# Patient Record
Sex: MALE | Race: WHITE | NOT HISPANIC OR LATINO | Employment: FULL TIME | URBAN - METROPOLITAN AREA
[De-identification: names, ages, dates, MRNs, and addresses within clinical notes are randomized per-mention and may not be internally consistent; named-entity substitution may affect disease eponyms.]

---

## 2023-01-09 ENCOUNTER — OFFICE VISIT (OUTPATIENT)
Dept: FAMILY MEDICINE CLINIC | Facility: CLINIC | Age: 48
End: 2023-01-09
Payer: MEDICAID

## 2023-01-09 VITALS
HEIGHT: 75 IN | TEMPERATURE: 97.6 F | WEIGHT: 225 LBS | BODY MASS INDEX: 27.98 KG/M2 | DIASTOLIC BLOOD PRESSURE: 90 MMHG | HEART RATE: 92 BPM | SYSTOLIC BLOOD PRESSURE: 130 MMHG | OXYGEN SATURATION: 99 %

## 2023-01-09 DIAGNOSIS — I10 PRIMARY HYPERTENSION: ICD-10-CM

## 2023-01-09 DIAGNOSIS — K21.9 GASTROESOPHAGEAL REFLUX DISEASE WITHOUT ESOPHAGITIS: Primary | ICD-10-CM

## 2023-01-09 PROCEDURE — 99214 OFFICE O/P EST MOD 30 MIN: CPT | Performed by: FAMILY MEDICINE

## 2023-01-09 RX ORDER — LISINOPRIL 20 MG/1
20 TABLET ORAL DAILY
Qty: 90 TABLET | Refills: 1 | Status: SHIPPED | OUTPATIENT
Start: 2023-01-09 | End: 2023-01-11

## 2023-01-09 RX ORDER — OMEPRAZOLE 40 MG/1
40 CAPSULE, DELAYED RELEASE ORAL EVERY OTHER DAY
COMMUNITY

## 2023-01-09 NOTE — PROGRESS NOTES
Subjective   Fabrice Birch is a 47 y.o. male.     Chief Complaint   Patient presents with   • Establish Care   • Hypertension     F/u ER       History of Present Illness   Patient has been to the immediate care center twice for episodes of hypertension.  He is here today to follow-up for that.  Has not presented to care in some time. Bp have been running 150/90 for weeks. Has been as high as 190/120. Have not been able to find [place] records. Has tried cutting down on sodium. Pt reports ekg ok. Pt is working on tapering smoking.     gerd- doing well on meds    The following portions of the patient's history were reviewed and updated as appropriate: allergies, current medications, past family history, past medical history, past social history, past surgical history and problem list.    History reviewed. No pertinent past medical history.    Past Surgical History:   Procedure Laterality Date   • SHOULDER ARTHROSCOPY W/ LABRAL REPAIR Right        Family History   Problem Relation Age of Onset   • Thyroid disease Mother    • Hypertension Mother    • Hypertension Father        Social History     Socioeconomic History   • Marital status:        Review of Systems   Eyes: Negative for visual disturbance.   Respiratory: Negative for shortness of breath.    Cardiovascular: Negative for chest pain.       Objective   Visit Vitals  /90   Pulse 92   Temp 97.6 °F (36.4 °C) (Infrared)   Ht 190.5 cm (75\")   Wt 102 kg (225 lb)   SpO2 99%   BMI 28.12 kg/m²     Body mass index is 28.12 kg/m².  Physical Exam  Constitutional:       Appearance: Normal appearance. He is well-developed.   Cardiovascular:      Rate and Rhythm: Normal rate and regular rhythm.      Heart sounds: Normal heart sounds.   Pulmonary:      Effort: Pulmonary effort is normal.      Breath sounds: Normal breath sounds.   Musculoskeletal:         General: No swelling. Normal range of motion.   Skin:     General: Skin is warm and dry.      Findings: No  rash.   Neurological:      General: No focal deficit present.      Mental Status: He is alert and oriented to person, place, and time.   Psychiatric:         Mood and Affect: Mood normal.         Behavior: Behavior normal.           Assessment & Plan   Diagnoses and all orders for this visit:    1. Gastroesophageal reflux disease without esophagitis (Primary)    2. Primary hypertension  -     lisinopril (PRINIVIL,ZESTRIL) 20 MG tablet; Take 1 tablet by mouth Daily.  Dispense: 90 tablet; Refill: 1  -     CBC & Differential  -     Comprehensive Metabolic Panel  -     Lipid Panel  -     TSH Rfx On Abnormal To Free T4  -     Urinalysis With Microscopic - Urine, Clean Catch      Risks and benefits discussed and started ace and f/u in 1 month. Pt is working on quitting smoking.

## 2023-01-10 LAB
ALBUMIN SERPL-MCNC: 5.5 G/DL (ref 3.5–5.2)
ALBUMIN/GLOB SERPL: 3.4 G/DL
ALP SERPL-CCNC: 80 U/L (ref 39–117)
ALT SERPL-CCNC: 27 U/L (ref 1–41)
APPEARANCE UR: ABNORMAL
AST SERPL-CCNC: 20 U/L (ref 1–40)
BACTERIA #/AREA URNS HPF: ABNORMAL /HPF
BASOPHILS # BLD AUTO: 0.05 10*3/MM3 (ref 0–0.2)
BASOPHILS NFR BLD AUTO: 0.6 % (ref 0–1.5)
BILIRUB SERPL-MCNC: 0.8 MG/DL (ref 0–1.2)
BILIRUB UR QL STRIP: NEGATIVE
BUN SERPL-MCNC: 24 MG/DL (ref 6–20)
BUN/CREAT SERPL: 24.2 (ref 7–25)
CALCIUM SERPL-MCNC: 10.2 MG/DL (ref 8.6–10.5)
CASTS URNS MICRO: ABNORMAL
CHLORIDE SERPL-SCNC: 101 MMOL/L (ref 98–107)
CHOLEST SERPL-MCNC: 218 MG/DL (ref 0–200)
CO2 SERPL-SCNC: 26.3 MMOL/L (ref 22–29)
COLOR UR: YELLOW
CREAT SERPL-MCNC: 0.99 MG/DL (ref 0.76–1.27)
EGFRCR SERPLBLD CKD-EPI 2021: 95.1 ML/MIN/1.73
EOSINOPHIL # BLD AUTO: 0.03 10*3/MM3 (ref 0–0.4)
EOSINOPHIL NFR BLD AUTO: 0.3 % (ref 0.3–6.2)
EPI CELLS #/AREA URNS HPF: ABNORMAL /HPF
ERYTHROCYTE [DISTWIDTH] IN BLOOD BY AUTOMATED COUNT: 12.6 % (ref 12.3–15.4)
GLOBULIN SER CALC-MCNC: 1.6 GM/DL
GLUCOSE SERPL-MCNC: 100 MG/DL (ref 65–99)
GLUCOSE UR QL STRIP: NEGATIVE
HCT VFR BLD AUTO: 48.6 % (ref 37.5–51)
HDLC SERPL-MCNC: 32 MG/DL (ref 40–60)
HGB BLD-MCNC: 16.5 G/DL (ref 13–17.7)
HGB UR QL STRIP: ABNORMAL
IMM GRANULOCYTES # BLD AUTO: 0.05 10*3/MM3 (ref 0–0.05)
IMM GRANULOCYTES NFR BLD AUTO: 0.6 % (ref 0–0.5)
KETONES UR QL STRIP: ABNORMAL
LDLC SERPL CALC-MCNC: 161 MG/DL (ref 0–100)
LEUKOCYTE ESTERASE UR QL STRIP: NEGATIVE
LYMPHOCYTES # BLD AUTO: 2.25 10*3/MM3 (ref 0.7–3.1)
LYMPHOCYTES NFR BLD AUTO: 25.8 % (ref 19.6–45.3)
MCH RBC QN AUTO: 29.1 PG (ref 26.6–33)
MCHC RBC AUTO-ENTMCNC: 34 G/DL (ref 31.5–35.7)
MCV RBC AUTO: 85.7 FL (ref 79–97)
MONOCYTES # BLD AUTO: 0.9 10*3/MM3 (ref 0.1–0.9)
MONOCYTES NFR BLD AUTO: 10.3 % (ref 5–12)
NEUTROPHILS # BLD AUTO: 5.45 10*3/MM3 (ref 1.7–7)
NEUTROPHILS NFR BLD AUTO: 62.4 % (ref 42.7–76)
NITRITE UR QL STRIP: NEGATIVE
NRBC BLD AUTO-RTO: 0 /100 WBC (ref 0–0.2)
PH UR STRIP: 6 [PH] (ref 5–8)
PLATELET # BLD AUTO: 280 10*3/MM3 (ref 140–450)
POTASSIUM SERPL-SCNC: 4.9 MMOL/L (ref 3.5–5.2)
PROT SERPL-MCNC: 7.1 G/DL (ref 6–8.5)
PROT UR QL STRIP: ABNORMAL
RBC # BLD AUTO: 5.67 10*6/MM3 (ref 4.14–5.8)
RBC #/AREA URNS HPF: ABNORMAL /HPF
SODIUM SERPL-SCNC: 139 MMOL/L (ref 136–145)
SP GR UR STRIP: ABNORMAL (ref 1–1.03)
TRIGL SERPL-MCNC: 135 MG/DL (ref 0–150)
TSH SERPL DL<=0.005 MIU/L-ACNC: 1.56 UIU/ML (ref 0.27–4.2)
UROBILINOGEN UR STRIP-MCNC: ABNORMAL MG/DL
VLDLC SERPL CALC-MCNC: 25 MG/DL (ref 5–40)
WBC # BLD AUTO: 8.73 10*3/MM3 (ref 3.4–10.8)
WBC #/AREA URNS HPF: ABNORMAL /HPF

## 2023-01-11 RX ORDER — LOSARTAN POTASSIUM 25 MG/1
25 TABLET ORAL DAILY
Qty: 90 TABLET | Refills: 1 | Status: SHIPPED | OUTPATIENT
Start: 2023-01-11

## 2023-02-06 ENCOUNTER — OFFICE VISIT (OUTPATIENT)
Dept: FAMILY MEDICINE CLINIC | Facility: CLINIC | Age: 48
End: 2023-02-06
Payer: MEDICAID

## 2023-02-06 VITALS
OXYGEN SATURATION: 98 % | DIASTOLIC BLOOD PRESSURE: 84 MMHG | TEMPERATURE: 97.5 F | BODY MASS INDEX: 27.91 KG/M2 | HEIGHT: 75 IN | HEART RATE: 79 BPM | WEIGHT: 224.5 LBS | SYSTOLIC BLOOD PRESSURE: 126 MMHG

## 2023-02-06 DIAGNOSIS — R31.21 ASYMPTOMATIC MICROSCOPIC HEMATURIA: ICD-10-CM

## 2023-02-06 DIAGNOSIS — Z72.0 TOBACCO ABUSE: ICD-10-CM

## 2023-02-06 DIAGNOSIS — I10 PRIMARY HYPERTENSION: Primary | ICD-10-CM

## 2023-02-06 PROCEDURE — 99214 OFFICE O/P EST MOD 30 MIN: CPT | Performed by: FAMILY MEDICINE

## 2023-02-06 NOTE — PROGRESS NOTES
"Subjective   Fabrice Birch is a 47 y.o. male.     Chief Complaint   Patient presents with   • Follow-up       History of Present Illness   Working hard on quitting smoking, has not smoked for a month.     htn- started lisinopril one month ago and is here for recheck. Had a cough with lisinopril and we changed it to losartan and he is having no SE with this. His bp has been very well controlled on this.     The following portions of the patient's history were reviewed and updated as appropriate: allergies, current medications, past family history, past medical history, past social history, past surgical history and problem list.    History reviewed. No pertinent past medical history.    Past Surgical History:   Procedure Laterality Date   • SHOULDER ARTHROSCOPY W/ LABRAL REPAIR Right        Family History   Problem Relation Age of Onset   • Thyroid disease Mother    • Hypertension Mother    • Hypertension Father        Social History     Socioeconomic History   • Marital status:    Tobacco Use   • Smoking status: Former     Types: Cigarettes       Review of Systems   Constitutional: Negative for fever.   Respiratory: Negative for shortness of breath.    Cardiovascular: Negative for chest pain.       Objective   Visit Vitals  /84 (BP Location: Left arm, Patient Position: Sitting)   Pulse 79   Temp 97.5 °F (36.4 °C)   Ht 190.5 cm (75\")   Wt 102 kg (224 lb 8 oz)   SpO2 98%   BMI 28.06 kg/m²     Body mass index is 28.06 kg/m².  Physical Exam  Constitutional:       Appearance: Normal appearance. He is well-developed.   Cardiovascular:      Rate and Rhythm: Normal rate and regular rhythm.      Heart sounds: Normal heart sounds.   Pulmonary:      Effort: Pulmonary effort is normal.      Breath sounds: Normal breath sounds.   Musculoskeletal:         General: No swelling. Normal range of motion.   Skin:     General: Skin is warm and dry.      Findings: No rash.   Neurological:      General: No focal deficit " present.      Mental Status: He is alert and oriented to person, place, and time.   Psychiatric:         Mood and Affect: Mood normal.         Behavior: Behavior normal.           Assessment & Plan   Diagnoses and all orders for this visit:    1. Primary hypertension (Primary)    2. Asymptomatic microscopic hematuria  -     Urinalysis With Microscopic - Urine, Clean Catch  -     Comprehensive Metabolic Panel    3. Tobacco abuse      If he has blood in urine will refer to urology. Otherwise cont meds, f/u in 6 months.   Pt wants labs mailed to him.

## 2023-02-07 LAB
ALBUMIN SERPL-MCNC: 5.1 G/DL (ref 3.5–5.2)
ALBUMIN/GLOB SERPL: 3.2 G/DL
ALP SERPL-CCNC: 83 U/L (ref 39–117)
ALT SERPL-CCNC: 25 U/L (ref 1–41)
APPEARANCE UR: CLEAR
AST SERPL-CCNC: 20 U/L (ref 1–40)
BACTERIA #/AREA URNS HPF: NORMAL /HPF
BILIRUB SERPL-MCNC: 0.4 MG/DL (ref 0–1.2)
BILIRUB UR QL STRIP: NEGATIVE
BUN SERPL-MCNC: 15 MG/DL (ref 6–20)
BUN/CREAT SERPL: 16.9 (ref 7–25)
CALCIUM SERPL-MCNC: 9.9 MG/DL (ref 8.6–10.5)
CASTS URNS MICRO: NORMAL
CHLORIDE SERPL-SCNC: 101 MMOL/L (ref 98–107)
CO2 SERPL-SCNC: 27.8 MMOL/L (ref 22–29)
COLOR UR: YELLOW
CREAT SERPL-MCNC: 0.89 MG/DL (ref 0.76–1.27)
EGFRCR SERPLBLD CKD-EPI 2021: 107 ML/MIN/1.73
EPI CELLS #/AREA URNS HPF: NORMAL /HPF
GLOBULIN SER CALC-MCNC: 1.6 GM/DL
GLUCOSE SERPL-MCNC: 84 MG/DL (ref 65–99)
GLUCOSE UR QL STRIP: NEGATIVE
HGB UR QL STRIP: NEGATIVE
KETONES UR QL STRIP: NEGATIVE
LEUKOCYTE ESTERASE UR QL STRIP: NEGATIVE
NITRITE UR QL STRIP: NEGATIVE
PH UR STRIP: 6 [PH] (ref 5–8)
POTASSIUM SERPL-SCNC: 4.9 MMOL/L (ref 3.5–5.2)
PROT SERPL-MCNC: 6.7 G/DL (ref 6–8.5)
PROT UR QL STRIP: NEGATIVE
RBC #/AREA URNS HPF: NORMAL /HPF
SODIUM SERPL-SCNC: 142 MMOL/L (ref 136–145)
SP GR UR STRIP: 1.01 (ref 1–1.03)
UROBILINOGEN UR STRIP-MCNC: NORMAL MG/DL
WBC #/AREA URNS HPF: NORMAL /HPF

## 2023-04-28 ENCOUNTER — TELEPHONE (OUTPATIENT)
Dept: FAMILY MEDICINE CLINIC | Facility: CLINIC | Age: 48
End: 2023-04-28
Payer: MEDICAID

## 2023-04-28 NOTE — TELEPHONE ENCOUNTER
Patient called with light headedness and pressure that made it difficult to focus. Patient said he has had this issue on ad off for the last week or so. Recommended urgent care as we have not same day appointments available. Patient expressed understanding.

## 2024-02-21 ENCOUNTER — OFFICE VISIT (OUTPATIENT)
Dept: FAMILY MEDICINE CLINIC | Facility: CLINIC | Age: 49
End: 2024-02-21
Payer: MEDICAID

## 2024-02-21 VITALS
HEIGHT: 75 IN | RESPIRATION RATE: 16 BRPM | TEMPERATURE: 98 F | DIASTOLIC BLOOD PRESSURE: 70 MMHG | BODY MASS INDEX: 29.09 KG/M2 | OXYGEN SATURATION: 97 % | WEIGHT: 234 LBS | SYSTOLIC BLOOD PRESSURE: 140 MMHG | HEART RATE: 76 BPM

## 2024-02-21 DIAGNOSIS — I10 PRIMARY HYPERTENSION: Primary | ICD-10-CM

## 2024-02-21 RX ORDER — HYDROCODONE BITARTRATE AND ACETAMINOPHEN 5; 325 MG/1; MG/1
1 TABLET ORAL AS NEEDED
COMMUNITY
Start: 2024-01-30

## 2024-02-21 RX ORDER — AMLODIPINE BESYLATE 5 MG/1
5 TABLET ORAL DAILY
Qty: 90 TABLET | Refills: 0 | Status: SHIPPED | OUTPATIENT
Start: 2024-02-21 | End: 2024-05-21

## 2024-02-21 NOTE — PROGRESS NOTES
"Chief Complaint  Hypertension    Subjective          History of Present Illness        HTN  BP Readings from Last 3 Encounters:   02/21/24 140/70   02/06/23 126/84   01/09/23 130/90   Patient reports he has been having dizzy spells and feeling like he is going to pass out.  In the evenings, /70-light headed stopped taking losartan for several months, staying in range.  Changes diet increased activity.  Sept 2023; dentist taking BP; always high; took losartan that morning, 140/90,   then almost passed out after procedure in the dentist.  Sept-December no pressure medication pills..  December; 180/120, later 140/90,   January dentist; taking medication every other day December-January 02/20/24; taking it every day.   Last week; standing up, became light headed , almost passed out.  No insurance right now.    Objective   Vital Signs:  /70 (BP Location: Right arm, Patient Position: Sitting, Cuff Size: Large Adult)   Pulse 76   Temp 98 °F (36.7 °C) (Temporal)   Resp 16   Ht 190.5 cm (75\")   Wt 106 kg (234 lb)   SpO2 97%   BMI 29.25 kg/m²   Estimated body mass index is 29.25 kg/m² as calculated from the following:    Height as of this encounter: 190.5 cm (75\").    Weight as of this encounter: 106 kg (234 lb).       BMI is >= 25 and <30. (Overweight) The following options were offered after discussion;: weight loss educational material (shared in after visit summary), exercise counseling/recommendations, and nutrition counseling/recommendations      Physical Exam  Vitals reviewed.   Constitutional:       General: He is not in acute distress.     Appearance: Normal appearance. He is obese.   HENT:      Head: Normocephalic and atraumatic.   Eyes:      Conjunctiva/sclera: Conjunctivae normal.      Pupils: Pupils are equal, round, and reactive to light.   Cardiovascular:      Rate and Rhythm: Normal rate and regular rhythm.      Pulses: Normal pulses.      Heart sounds: No murmur heard.     No gallop. "   Pulmonary:      Effort: Pulmonary effort is normal. No respiratory distress.      Breath sounds: Normal breath sounds. No wheezing.   Abdominal:      General: Bowel sounds are normal. There is no distension.      Palpations: Abdomen is soft.      Tenderness: There is no abdominal tenderness.   Musculoskeletal:         General: Normal range of motion.      Cervical back: Normal range of motion and neck supple. No tenderness.      Right lower leg: No edema.      Left lower leg: No edema.   Skin:     General: Skin is warm and dry.   Neurological:      Mental Status: He is alert and oriented to person, place, and time. Mental status is at baseline.   Psychiatric:         Mood and Affect: Mood normal.        Result Review :    The following data was reviewed by: Kim Hay APRN on 02/21/2024:                 Assessment and Plan     Diagnoses and all orders for this visit:    1. Primary hypertension (Primary)  -     Ambulatory Referral to Cardiology  -     amLODIPine (Norvasc) 5 MG tablet; Take 1 tablet by mouth Daily for 90 days.  Dispense: 90 tablet; Refill: 0    Discontinue losartan.  Unable to tolerate lisinopril.  Prescribed amlodipine today, discussed most common side effects.  Patient does not currently have insurance, will wait on blood work until may when he has insurance.  Patient to monitor blood pressure daily with new medication, log it, if continuingly staying elevated, follow-up in office for medication adjustment.         Follow Up     Return in about 3 months (around 5/21/2024) for Recheck.  Patient was given instructions and counseling regarding his condition or for health maintenance advice. Please see specific information pulled into the AVS if appropriate.

## 2025-02-03 ENCOUNTER — OFFICE VISIT (OUTPATIENT)
Dept: FAMILY MEDICINE CLINIC | Facility: CLINIC | Age: 50
End: 2025-02-03
Payer: COMMERCIAL

## 2025-02-03 VITALS
TEMPERATURE: 98.2 F | OXYGEN SATURATION: 99 % | DIASTOLIC BLOOD PRESSURE: 80 MMHG | SYSTOLIC BLOOD PRESSURE: 154 MMHG | BODY MASS INDEX: 29.47 KG/M2 | HEART RATE: 77 BPM | WEIGHT: 237 LBS | HEIGHT: 75 IN

## 2025-02-03 DIAGNOSIS — N50.812 LEFT TESTICULAR PAIN: ICD-10-CM

## 2025-02-03 DIAGNOSIS — I10 PRIMARY HYPERTENSION: Primary | ICD-10-CM

## 2025-02-03 DIAGNOSIS — Z23 IMMUNIZATION DUE: ICD-10-CM

## 2025-02-03 DIAGNOSIS — Z12.11 COLON CANCER SCREENING: ICD-10-CM

## 2025-02-03 DIAGNOSIS — L72.3 SEBACEOUS CYST OF AXILLA: ICD-10-CM

## 2025-02-03 PROCEDURE — 90471 IMMUNIZATION ADMIN: CPT | Performed by: FAMILY MEDICINE

## 2025-02-03 PROCEDURE — 90715 TDAP VACCINE 7 YRS/> IM: CPT | Performed by: FAMILY MEDICINE

## 2025-02-03 PROCEDURE — 99214 OFFICE O/P EST MOD 30 MIN: CPT | Performed by: FAMILY MEDICINE

## 2025-02-03 RX ORDER — METOPROLOL TARTRATE 100 MG/1
100 TABLET ORAL 2 TIMES DAILY
COMMUNITY
End: 2025-02-03

## 2025-02-03 RX ORDER — LISINOPRIL 20 MG/1
20 TABLET ORAL DAILY
Qty: 90 TABLET | Refills: 3 | Status: SHIPPED | OUTPATIENT
Start: 2025-02-03

## 2025-02-03 NOTE — PROGRESS NOTES
Subjective   Fabrice Birch is a 49 y.o. male.     Chief Complaint   Patient presents with    Blood Pressure Check     Might need a colonoscopy. Is having pain in his left testicle every once in a while. Has a spot under his left armpit. Had some blood in his stool for a bit.       Htn- having highs but not taking meds. He was having some exercise intolerance. BP mostly 140/90 at home. Not taking metoprolol. Has had cough with losartan. Amlodipine caused chest pain. He is wanting to try lisinopril again.     Had some streaks of bright red blood on stool with painful bm. Has h/o fissures. It is resolved now but he has not had screening and wanting colonoscopy. Declines exam.     Having intermittent pain in left testicle. Never feels a bulge. Declines exam by me. Better when he lays down. Dull and achy. Not high ridding.       Pt has a spot under his left armpit. Has had this for years and started as an infected hair. It went back down after squeezing but it always recurs there.     The following portions of the patient's history were reviewed and updated as appropriate: allergies, current medications, past family history, past medical history, past social history, past surgical history and problem list.    History reviewed. No pertinent past medical history.    Past Surgical History:   Procedure Laterality Date    SHOULDER ARTHROSCOPY W/ LABRAL REPAIR Right        Family History   Problem Relation Age of Onset    Thyroid disease Mother     Hypertension Mother     Hypertension Father        Social History     Socioeconomic History    Marital status:    Tobacco Use    Smoking status: Former     Types: Cigarettes    Smokeless tobacco: Never   Vaping Use    Vaping status: Never Used   Substance and Sexual Activity    Alcohol use: Never    Drug use: Never    Sexual activity: Defer       Review of Systems   Respiratory:  Negative for shortness of breath.    Cardiovascular:  Negative for chest pain.       Objective  "  Visit Vitals  /80 (BP Location: Left arm, Patient Position: Sitting, Cuff Size: Adult)   Pulse 77   Temp 98.2 °F (36.8 °C)   Ht 190.5 cm (75\")   Wt 108 kg (237 lb)   SpO2 99%   BMI 29.62 kg/m²     Body mass index is 29.62 kg/m².  Physical Exam  Constitutional:       Appearance: Normal appearance. He is well-developed.   Cardiovascular:      Rate and Rhythm: Normal rate and regular rhythm.      Heart sounds: Normal heart sounds.   Pulmonary:      Effort: Pulmonary effort is normal.      Breath sounds: Normal breath sounds.   Musculoskeletal:         General: No swelling. Normal range of motion.   Skin:     General: Skin is warm and dry.      Findings: No rash.   Neurological:      General: No focal deficit present.      Mental Status: He is alert and oriented to person, place, and time.   Psychiatric:         Mood and Affect: Mood normal.         Behavior: Behavior normal.           Assessment & Plan   Diagnoses and all orders for this visit:    1. Primary hypertension (Primary)  -     lisinopril (PRINIVIL,ZESTRIL) 20 MG tablet; Take 1 tablet by mouth Daily.  Dispense: 90 tablet; Refill: 3  -     Comprehensive Metabolic Panel  -     Lipid Panel    2. Colon cancer screening  -     Ambulatory Referral For Screening Colonoscopy    3. Sebaceous cyst of axilla  -     Ambulatory Referral to General Surgery    4. Left testicular pain  -     US Scrotum & Testicles    5. Immunization due  -     Tdap Vaccine Greater Than or Equal To 6yo IM                   Advised stool softener and f/u if worse or no better. Discussed risks and benefits and will try lisinopril. F/U in 1 month. If we cannot get his bp controlled will refer to cardiology.   "

## 2025-02-04 LAB
ALBUMIN SERPL-MCNC: 4.8 G/DL (ref 3.5–5.2)
ALBUMIN/GLOB SERPL: 2.3 G/DL
ALP SERPL-CCNC: 84 U/L (ref 39–117)
ALT SERPL-CCNC: 24 U/L (ref 1–41)
AST SERPL-CCNC: 20 U/L (ref 1–40)
BILIRUB SERPL-MCNC: 0.4 MG/DL (ref 0–1.2)
BUN SERPL-MCNC: 14 MG/DL (ref 6–20)
BUN/CREAT SERPL: 17.1 (ref 7–25)
CALCIUM SERPL-MCNC: 9.7 MG/DL (ref 8.6–10.5)
CHLORIDE SERPL-SCNC: 103 MMOL/L (ref 98–107)
CHOLEST SERPL-MCNC: 201 MG/DL (ref 0–200)
CO2 SERPL-SCNC: 26 MMOL/L (ref 22–29)
CREAT SERPL-MCNC: 0.82 MG/DL (ref 0.76–1.27)
EGFRCR SERPLBLD CKD-EPI 2021: 108.4 ML/MIN/1.73
GLOBULIN SER CALC-MCNC: 2.1 GM/DL
GLUCOSE SERPL-MCNC: 89 MG/DL (ref 65–99)
HDLC SERPL-MCNC: 30 MG/DL (ref 40–60)
LDLC SERPL CALC-MCNC: 121 MG/DL (ref 0–100)
POTASSIUM SERPL-SCNC: 4.8 MMOL/L (ref 3.5–5.2)
PROT SERPL-MCNC: 6.9 G/DL (ref 6–8.5)
SODIUM SERPL-SCNC: 140 MMOL/L (ref 136–145)
TRIGL SERPL-MCNC: 283 MG/DL (ref 0–150)
VLDLC SERPL CALC-MCNC: 50 MG/DL (ref 5–40)

## 2025-03-03 ENCOUNTER — OFFICE VISIT (OUTPATIENT)
Dept: FAMILY MEDICINE CLINIC | Facility: CLINIC | Age: 50
End: 2025-03-03
Payer: COMMERCIAL

## 2025-03-03 VITALS
DIASTOLIC BLOOD PRESSURE: 90 MMHG | HEART RATE: 74 BPM | SYSTOLIC BLOOD PRESSURE: 142 MMHG | WEIGHT: 233.8 LBS | TEMPERATURE: 97.8 F | OXYGEN SATURATION: 100 % | HEIGHT: 75 IN | BODY MASS INDEX: 29.07 KG/M2

## 2025-03-03 DIAGNOSIS — I10 PRIMARY HYPERTENSION: Primary | ICD-10-CM

## 2025-03-03 DIAGNOSIS — Z12.11 COLON CANCER SCREENING: ICD-10-CM

## 2025-03-03 DIAGNOSIS — E78.2 MIXED HYPERLIPIDEMIA: ICD-10-CM

## 2025-03-03 PROCEDURE — 99214 OFFICE O/P EST MOD 30 MIN: CPT | Performed by: FAMILY MEDICINE

## 2025-03-03 NOTE — PROGRESS NOTES
"Subjective   Fabrice Birch is a 49 y.o. male.     Chief Complaint   Patient presents with    Follow-up       Hypertension-patient was having high blood pressures to 140/90 at home and had stopped taking his medication.  He was wanting to try lisinopril again.  We started this 1 month ago and since that time his blood pressure improved since he was out of pain and had his teeth removed. He stopped the meds as he was unsure if he still needed meds. At home bp 137/77. Has gone to low sodium diet and stopped smoking.       Hyperlipidemia-his labs 1 month ago were improved but his cholesterol is still borderline high.  We discussed diet and exercise.  Patient is declining a statin.      Pt's rectal tear has healed and he is not longer having any pain or bleeding or black stool. Declines colonoscopy but wants cologard.       The following portions of the patient's history were reviewed and updated as appropriate: allergies, current medications, past family history, past medical history, past social history, past surgical history and problem list.    History reviewed. No pertinent past medical history.    Past Surgical History:   Procedure Laterality Date    SHOULDER ARTHROSCOPY W/ LABRAL REPAIR Right        Family History   Problem Relation Age of Onset    Thyroid disease Mother     Hypertension Mother     Hypertension Father        Social History     Socioeconomic History    Marital status:    Tobacco Use    Smoking status: Former     Types: Cigarettes    Smokeless tobacco: Never   Vaping Use    Vaping status: Never Used   Substance and Sexual Activity    Alcohol use: Never    Drug use: Never    Sexual activity: Defer       Review of Systems   Constitutional:  Negative for fever.   Respiratory:  Negative for shortness of breath.        Objective   Visit Vitals  /90 (BP Location: Left arm, Patient Position: Sitting, Cuff Size: Adult)   Pulse 74   Temp 97.8 °F (36.6 °C)   Ht 190.5 cm (75\")   Wt 106 kg (233 lb " 12.8 oz)   SpO2 100%   BMI 29.22 kg/m²     Body mass index is 29.22 kg/m².  Physical Exam  Constitutional:       Appearance: Normal appearance. He is well-developed.   Cardiovascular:      Rate and Rhythm: Normal rate and regular rhythm.      Heart sounds: Normal heart sounds.   Pulmonary:      Effort: Pulmonary effort is normal.      Breath sounds: Normal breath sounds.   Musculoskeletal:         General: No swelling. Normal range of motion.   Skin:     General: Skin is warm and dry.      Findings: No rash.   Neurological:      General: No focal deficit present.      Mental Status: He is alert and oriented to person, place, and time.   Psychiatric:         Mood and Affect: Mood normal.         Behavior: Behavior normal.           Assessment & Plan   Diagnoses and all orders for this visit:    1. Primary hypertension (Primary)    2. Mixed hyperlipidemia    3. Colon cancer screening  -     Cologuard - Stool, Per Rectum; Future          F/U if rectal bleeding recurs. Will monitor bp and f/u for bp greater than 140/90. Will continue to work on diet and exercise as pt would prefer not to start cholesterol meds. F/U in 6 months. Encouraged to get testicular ultrasound.